# Patient Record
Sex: FEMALE | Race: WHITE | Employment: OTHER | URBAN - METROPOLITAN AREA
[De-identification: names, ages, dates, MRNs, and addresses within clinical notes are randomized per-mention and may not be internally consistent; named-entity substitution may affect disease eponyms.]

---

## 2018-12-31 ENCOUNTER — ANESTHESIA EVENT (OUTPATIENT)
Dept: SURGERY | Age: 68
End: 2018-12-31
Payer: MEDICARE

## 2018-12-31 ENCOUNTER — HOSPITAL ENCOUNTER (OUTPATIENT)
Dept: LAB | Age: 68
Discharge: HOME | End: 2018-12-31
Payer: MEDICARE

## 2018-12-31 DIAGNOSIS — Z01.818 PRE-OP TESTING: ICD-10-CM

## 2018-12-31 LAB
ATRIAL RATE: 68 BPM
CALCULATED P AXIS, ECG09: 43 DEGREES
CALCULATED R AXIS, ECG10: 15 DEGREES
CALCULATED T AXIS, ECG11: 53 DEGREES
DIAGNOSIS, 93000: NORMAL
P-R INTERVAL, ECG05: 154 MS
Q-T INTERVAL, ECG07: 398 MS
QRS DURATION, ECG06: 76 MS
QTC CALCULATION (BEZET), ECG08: 423 MS
VENTRICULAR RATE, ECG03: 68 BPM

## 2018-12-31 PROCEDURE — 93005 ELECTROCARDIOGRAM TRACING: CPT

## 2019-01-01 ENCOUNTER — HOSPITAL ENCOUNTER (OUTPATIENT)
Age: 69
Discharge: HOME | End: 2019-01-01
Attending: ORTHOPAEDIC SURGERY | Admitting: ORTHOPAEDIC SURGERY
Payer: MEDICARE

## 2019-01-01 ENCOUNTER — APPOINTMENT (OUTPATIENT)
Dept: GENERAL RADIOLOGY | Age: 69
End: 2019-01-01
Attending: ORTHOPAEDIC SURGERY
Payer: MEDICARE

## 2019-01-01 ENCOUNTER — ANESTHESIA (OUTPATIENT)
Dept: SURGERY | Age: 69
End: 2019-01-01
Payer: MEDICARE

## 2019-01-01 VITALS
RESPIRATION RATE: 12 BRPM | OXYGEN SATURATION: 94 % | HEIGHT: 67 IN | SYSTOLIC BLOOD PRESSURE: 136 MMHG | HEART RATE: 92 BPM | WEIGHT: 168.5 LBS | TEMPERATURE: 98.7 F | BODY MASS INDEX: 26.45 KG/M2 | DIASTOLIC BLOOD PRESSURE: 61 MMHG

## 2019-01-01 PROBLEM — S52.90XA FRACTURE, RADIUS: Status: ACTIVE | Noted: 2019-01-01

## 2019-01-01 PROCEDURE — 77030016472 HC BIT DRL QC2 SYNT -C: Performed by: ORTHOPAEDIC SURGERY

## 2019-01-01 PROCEDURE — 74011250636 HC RX REV CODE- 250/636: Performed by: ORTHOPAEDIC SURGERY

## 2019-01-01 PROCEDURE — C1713 ANCHOR/SCREW BN/BN,TIS/BN: HCPCS | Performed by: ORTHOPAEDIC SURGERY

## 2019-01-01 PROCEDURE — 64450 NJX AA&/STRD OTHER PN/BRANCH: CPT | Performed by: ANESTHESIOLOGY

## 2019-01-01 PROCEDURE — 74011000250 HC RX REV CODE- 250

## 2019-01-01 PROCEDURE — 76010000153 HC OR TIME 1.5 TO 2 HR: Performed by: ORTHOPAEDIC SURGERY

## 2019-01-01 PROCEDURE — 74011250636 HC RX REV CODE- 250/636

## 2019-01-01 PROCEDURE — 76942 ECHO GUIDE FOR BIOPSY: CPT | Performed by: ANESTHESIOLOGY

## 2019-01-01 PROCEDURE — 74011250636 HC RX REV CODE- 250/636: Performed by: ANESTHESIOLOGY

## 2019-01-01 PROCEDURE — 77030008847 HC WRE K SYNT -A: Performed by: ORTHOPAEDIC SURGERY

## 2019-01-01 PROCEDURE — 77030032490 HC SLV COMPR SCD KNE COVD -B: Performed by: ORTHOPAEDIC SURGERY

## 2019-01-01 PROCEDURE — 74011250636 HC RX REV CODE- 250/636: Performed by: NURSE ANESTHETIST, CERTIFIED REGISTERED

## 2019-01-01 PROCEDURE — 76060000034 HC ANESTHESIA 1.5 TO 2 HR: Performed by: ORTHOPAEDIC SURGERY

## 2019-01-01 PROCEDURE — 77030010509 HC AIRWY LMA MSK TELE -A: Performed by: ANESTHESIOLOGY

## 2019-01-01 PROCEDURE — 77030031139 HC SUT VCRL2 J&J -A: Performed by: ORTHOPAEDIC SURGERY

## 2019-01-01 PROCEDURE — 77030020782 HC GWN BAIR PAWS FLX 3M -B: Performed by: ORTHOPAEDIC SURGERY

## 2019-01-01 PROCEDURE — 77030018836 HC SOL IRR NACL ICUM -A: Performed by: ORTHOPAEDIC SURGERY

## 2019-01-01 PROCEDURE — 76210000026 HC REC RM PH II 1 TO 1.5 HR: Performed by: ORTHOPAEDIC SURGERY

## 2019-01-01 PROCEDURE — 73100 X-RAY EXAM OF WRIST: CPT

## 2019-01-01 PROCEDURE — 76210000063 HC OR PH I REC FIRST 0.5 HR: Performed by: ORTHOPAEDIC SURGERY

## 2019-01-01 PROCEDURE — 77030003601 HC NDL NRV BLK BBMI -A: Performed by: ORTHOPAEDIC SURGERY

## 2019-01-01 PROCEDURE — 77030003862 HC BIT DRL SYNT -B: Performed by: ORTHOPAEDIC SURGERY

## 2019-01-01 PROCEDURE — 77030002916 HC SUT ETHLN J&J -A: Performed by: ORTHOPAEDIC SURGERY

## 2019-01-01 DEVICE — SCREW BNE L22MM DIA2.4MM DST RAD VOLAR S STL ST VAR ANG LOK: Type: IMPLANTABLE DEVICE | Site: WRIST | Status: FUNCTIONAL

## 2019-01-01 DEVICE — 2.4MM VA LOCKING SCREW STARDRIVE 20MM: Type: IMPLANTABLE DEVICE | Site: WRIST | Status: FUNCTIONAL

## 2019-01-01 DEVICE — SPINAL INSTRUMENT RONGEUR (STRAIGHT) 8MM: Type: IMPLANTABLE DEVICE | Site: WRIST | Status: FUNCTIONAL

## 2019-01-01 DEVICE — 2.7MM CORTEX SCREW SLF-TPNG WITH T8 STARDRIVE RECESS 12MM: Type: IMPLANTABLE DEVICE | Site: WRIST | Status: FUNCTIONAL

## 2019-01-01 DEVICE — SCREW BNE L24MM DIA2.4MM DST RAD VOLAR S STL ST VAR ANG LOK: Type: IMPLANTABLE DEVICE | Site: WRIST | Status: FUNCTIONAL

## 2019-01-01 DEVICE — K WIRE FIX L150MM DIA1.25MM S STL TRCR PNT: Type: IMPLANTABLE DEVICE | Status: FUNCTIONAL

## 2019-01-01 DEVICE — SCREW BNE L22MM DIA2.4MM CORT S STL ST T8 STARDRV RECESS: Type: IMPLANTABLE DEVICE | Site: WRIST | Status: FUNCTIONAL

## 2019-01-01 DEVICE — IMPLANTABLE DEVICE: Type: IMPLANTABLE DEVICE | Site: WRIST | Status: FUNCTIONAL

## 2019-01-01 RX ORDER — EPHEDRINE SULFATE/0.9% NACL/PF 25 MG/5 ML
SYRINGE (ML) INTRAVENOUS AS NEEDED
Status: DISCONTINUED | OUTPATIENT
Start: 2019-01-01 | End: 2019-01-01 | Stop reason: HOSPADM

## 2019-01-01 RX ORDER — DEXAMETHASONE SODIUM PHOSPHATE 4 MG/ML
INJECTION, SOLUTION INTRA-ARTICULAR; INTRALESIONAL; INTRAMUSCULAR; INTRAVENOUS; SOFT TISSUE AS NEEDED
Status: DISCONTINUED | OUTPATIENT
Start: 2019-01-01 | End: 2019-01-01 | Stop reason: HOSPADM

## 2019-01-01 RX ORDER — ONDANSETRON 2 MG/ML
INJECTION INTRAMUSCULAR; INTRAVENOUS AS NEEDED
Status: DISCONTINUED | OUTPATIENT
Start: 2019-01-01 | End: 2019-01-01 | Stop reason: HOSPADM

## 2019-01-01 RX ORDER — SODIUM CHLORIDE, SODIUM LACTATE, POTASSIUM CHLORIDE, CALCIUM CHLORIDE 600; 310; 30; 20 MG/100ML; MG/100ML; MG/100ML; MG/100ML
75 INJECTION, SOLUTION INTRAVENOUS CONTINUOUS
Status: DISCONTINUED | OUTPATIENT
Start: 2019-01-01 | End: 2019-01-01 | Stop reason: HOSPADM

## 2019-01-01 RX ORDER — FENTANYL CITRATE 50 UG/ML
50 INJECTION, SOLUTION INTRAMUSCULAR; INTRAVENOUS
Status: DISCONTINUED | OUTPATIENT
Start: 2019-01-01 | End: 2019-01-01 | Stop reason: HOSPADM

## 2019-01-01 RX ORDER — ROPIVACAINE HYDROCHLORIDE 5 MG/ML
INJECTION, SOLUTION EPIDURAL; INFILTRATION; PERINEURAL
Status: COMPLETED | OUTPATIENT
Start: 2019-01-01 | End: 2019-01-01

## 2019-01-01 RX ORDER — SODIUM CHLORIDE 0.9 % (FLUSH) 0.9 %
5-10 SYRINGE (ML) INJECTION EVERY 8 HOURS
Status: DISCONTINUED | OUTPATIENT
Start: 2019-01-01 | End: 2019-01-01 | Stop reason: HOSPADM

## 2019-01-01 RX ORDER — CEFAZOLIN SODIUM 2 G/50ML
2 SOLUTION INTRAVENOUS ONCE
Status: COMPLETED | OUTPATIENT
Start: 2019-01-01 | End: 2019-01-01

## 2019-01-01 RX ORDER — ONDANSETRON 2 MG/ML
4 INJECTION INTRAMUSCULAR; INTRAVENOUS ONCE
Status: COMPLETED | OUTPATIENT
Start: 2019-01-01 | End: 2019-01-01

## 2019-01-01 RX ORDER — LIDOCAINE HYDROCHLORIDE 10 MG/ML
0.1 INJECTION, SOLUTION EPIDURAL; INFILTRATION; INTRACAUDAL; PERINEURAL AS NEEDED
Status: DISCONTINUED | OUTPATIENT
Start: 2019-01-01 | End: 2019-01-01 | Stop reason: HOSPADM

## 2019-01-01 RX ORDER — MIDAZOLAM HYDROCHLORIDE 1 MG/ML
2 INJECTION, SOLUTION INTRAMUSCULAR; INTRAVENOUS ONCE
Status: COMPLETED | OUTPATIENT
Start: 2019-01-01 | End: 2019-01-01

## 2019-01-01 RX ORDER — SODIUM CHLORIDE 0.9 % (FLUSH) 0.9 %
5-10 SYRINGE (ML) INJECTION AS NEEDED
Status: DISCONTINUED | OUTPATIENT
Start: 2019-01-01 | End: 2019-01-01 | Stop reason: HOSPADM

## 2019-01-01 RX ORDER — LIDOCAINE HYDROCHLORIDE 20 MG/ML
INJECTION, SOLUTION EPIDURAL; INFILTRATION; INTRACAUDAL; PERINEURAL AS NEEDED
Status: DISCONTINUED | OUTPATIENT
Start: 2019-01-01 | End: 2019-01-01 | Stop reason: HOSPADM

## 2019-01-01 RX ORDER — ROPIVACAINE HYDROCHLORIDE 5 MG/ML
30 INJECTION, SOLUTION EPIDURAL; INFILTRATION; PERINEURAL
Status: DISCONTINUED | OUTPATIENT
Start: 2019-01-01 | End: 2019-01-01 | Stop reason: HOSPADM

## 2019-01-01 RX ORDER — FENTANYL CITRATE 50 UG/ML
INJECTION, SOLUTION INTRAMUSCULAR; INTRAVENOUS AS NEEDED
Status: DISCONTINUED | OUTPATIENT
Start: 2019-01-01 | End: 2019-01-01 | Stop reason: HOSPADM

## 2019-01-01 RX ORDER — PROPOFOL 10 MG/ML
INJECTION, EMULSION INTRAVENOUS AS NEEDED
Status: DISCONTINUED | OUTPATIENT
Start: 2019-01-01 | End: 2019-01-01 | Stop reason: HOSPADM

## 2019-01-01 RX ADMIN — LIDOCAINE HYDROCHLORIDE 60 MG: 20 INJECTION, SOLUTION EPIDURAL; INFILTRATION; INTRACAUDAL; PERINEURAL at 07:47

## 2019-01-01 RX ADMIN — Medication 10 MG: at 08:19

## 2019-01-01 RX ADMIN — FENTANYL CITRATE 25 MCG: 50 INJECTION, SOLUTION INTRAMUSCULAR; INTRAVENOUS at 08:33

## 2019-01-01 RX ADMIN — FAMOTIDINE 20 MG: 10 INJECTION INTRAVENOUS at 07:15

## 2019-01-01 RX ADMIN — FENTANYL CITRATE 50 MCG: 50 INJECTION INTRAMUSCULAR; INTRAVENOUS at 07:31

## 2019-01-01 RX ADMIN — Medication 10 MG: at 08:01

## 2019-01-01 RX ADMIN — SODIUM CHLORIDE, SODIUM LACTATE, POTASSIUM CHLORIDE, AND CALCIUM CHLORIDE 75 ML/HR: 600; 310; 30; 20 INJECTION, SOLUTION INTRAVENOUS at 07:15

## 2019-01-01 RX ADMIN — FENTANYL CITRATE 25 MCG: 50 INJECTION, SOLUTION INTRAMUSCULAR; INTRAVENOUS at 09:06

## 2019-01-01 RX ADMIN — FENTANYL CITRATE 25 MCG: 50 INJECTION, SOLUTION INTRAMUSCULAR; INTRAVENOUS at 09:22

## 2019-01-01 RX ADMIN — FENTANYL CITRATE 25 MCG: 50 INJECTION, SOLUTION INTRAMUSCULAR; INTRAVENOUS at 08:08

## 2019-01-01 RX ADMIN — SODIUM CHLORIDE, SODIUM LACTATE, POTASSIUM CHLORIDE, AND CALCIUM CHLORIDE: 600; 310; 30; 20 INJECTION, SOLUTION INTRAVENOUS at 07:43

## 2019-01-01 RX ADMIN — PROPOFOL 150 MG: 10 INJECTION, EMULSION INTRAVENOUS at 07:47

## 2019-01-01 RX ADMIN — DEXAMETHASONE SODIUM PHOSPHATE 4 MG: 4 INJECTION, SOLUTION INTRA-ARTICULAR; INTRALESIONAL; INTRAMUSCULAR; INTRAVENOUS; SOFT TISSUE at 08:00

## 2019-01-01 RX ADMIN — ROPIVACAINE HYDROCHLORIDE 30 ML: 5 INJECTION, SOLUTION EPIDURAL; INFILTRATION; PERINEURAL at 07:37

## 2019-01-01 RX ADMIN — CEFAZOLIN SODIUM 2 G: 2 SOLUTION INTRAVENOUS at 07:44

## 2019-01-01 RX ADMIN — MIDAZOLAM HYDROCHLORIDE 2 MG: 2 INJECTION, SOLUTION INTRAMUSCULAR; INTRAVENOUS at 07:31

## 2019-01-01 RX ADMIN — ONDANSETRON 4 MG: 2 INJECTION INTRAMUSCULAR; INTRAVENOUS at 10:15

## 2019-01-01 RX ADMIN — ONDANSETRON 4 MG: 2 INJECTION INTRAMUSCULAR; INTRAVENOUS at 08:58

## 2019-01-01 NOTE — ANESTHESIA POSTPROCEDURE EVALUATION
Procedure(s):  OPEN REDUCTION INTERNAL FIXATION LEFT WRIST.     Anesthesia Post Evaluation      Multimodal analgesia: multimodal analgesia used between 6 hours prior to anesthesia start to PACU discharge  Patient location during evaluation: bedside  Patient participation: complete - patient participated  Level of consciousness: awake  Pain management: adequate  Airway patency: patent  Anesthetic complications: no  Cardiovascular status: stable  Respiratory status: acceptable  Hydration status: acceptable  Post anesthesia nausea and vomiting:  controlled      Visit Vitals  /61   Pulse 92   Temp 37.1 °C (98.7 °F)   Resp 12   Ht 5' 7\" (1.702 m)   Wt 76.4 kg (168 lb 8 oz)   SpO2 94%   BMI 26.39 kg/m²

## 2019-01-01 NOTE — OP NOTES
[facility]  OPERATIVE REPORT    Patty Malone  MR#: [id number]  : 1950  ACCOUNT #: [de-identified]   DATE OF SERVICE: 2019    PREOPERATIVE DIAGNOSIS:  Displaced angulated distal radius and ulnar fracture of the left wrist.    POSTOPERATIVE DIAGNOSIS:  Displaced angulated distal radius and ulnar fracture of the left wrist.    PROCEDURE PERFORMED:  Open reduction internal fixation of distal radius fracture with Synthes volar plate. SURGEON:  Leona Nielsen MD    ASSISTANT:  Mariah Giordano    ANESTHESIA:  General with block. SPECIMENS REMOVED:  None. IMPLANTS:  Volar plate    COMPLICATIONS:  None. ESTIMATED BLOOD LOSS:  20 mL. SUMMARY OF PROCEDURE:  After general anesthesia was induced, the patient's wrist was prepped and draped in the routine sterile fashion. Limb was exsanguinated with an Esmarch bandage and the upper arm tourniquet inflated to 250 mmHg. A volar incision was made extending deeply through to the flexor carpi radialis tendon sheath. This was incised and the flexor carpi radialis was retracted. The underlying pronator quadratus was elevated and the fracture examined. There was some comminution at the distal radius fracture site. Soft tissue was cleared. The fracture was reduced and a Synthes volar plate fixed under C-arm visualization. After this was done, x-ray showed adequate placement of the plate and screws and adequate alignment of the distal radius fracture. The distal ulnar fracture was in good alignment and it was elected not to proceed with an open reduction of the distal ulna. The tourniquet was deflated and hemostasis achieved with electrocautery. The radial pulse was bounding. There was no bleeding from the radial artery. The wound was closed in layers and a volar splint applied.       Rancho RAYGOZA  D: 2019 08:56     T: 2019 09:03  JOB #: [id number]

## 2019-01-01 NOTE — DISCHARGE INSTRUCTIONS
DISCHARGE SUMMARY from Nurse    PATIENT INSTRUCTIONS:    After general anesthesia or intravenous sedation, for 24 hours or while taking prescription Narcotics:  · Limit your activities  · Do not drive and operate hazardous machinery  · Do not make important personal or business decisions  · Do  not drink alcoholic beverages  · If you have not urinated within 8 hours after discharge, please contact your surgeon on call. Report the following to your surgeon:  · Excessive pain, swelling, redness or odor of or around the surgical area  · Temperature over 100.5  · Nausea and vomiting lasting longer than 4 hours or if unable to take medications  · Any signs of decreased circulation or nerve impairment to extremity: change in color, persistent  numbness, tingling, coldness or increase pain  · Any questions    What to do at Home:  Recommended activity: Activity as tolerated and no driving for today and No heavy lifting, pushing, pulling with the left arm until cleared by Dr. Maryse Powers. *  Please give a list of your current medications to your Primary Care Provider. *  Please update this list whenever your medications are discontinued, doses are      changed, or new medications (including over-the-counter products) are added. *  Please carry medication information at all times in case of emergency situations. These are general instructions for a healthy lifestyle:    No smoking/ No tobacco products/ Avoid exposure to second hand smoke  Surgeon General's Warning:  Quitting smoking now greatly reduces serious risk to your health.     Obesity, smoking, and sedentary lifestyle greatly increases your risk for illness    A healthy diet, regular physical exercise & weight monitoring are important for maintaining a healthy lifestyle    You may be retaining fluid if you have a history of heart failure or if you experience any of the following symptoms:  Weight gain of 3 pounds or more overnight or 5 pounds in a week, increased swelling in our hands or feet or shortness of breath while lying flat in bed. Please call your doctor as soon as you notice any of these symptoms; do not wait until your next office visit. Recognize signs and symptoms of STROKE:    F-face looks uneven    A-arms unable to move or move unevenly    S-speech slurred or non-existent    T-time-call 911 as soon as signs and symptoms begin-DO NOT go       Back to bed or wait to see if you get better-TIME IS BRAIN. Warning Signs of HEART ATTACK     Call 911 if you have these symptoms:   Chest discomfort. Most heart attacks involve discomfort in the center of the chest that lasts more than a few minutes, or that goes away and comes back. It can feel like uncomfortable pressure, squeezing, fullness, or pain.  Discomfort in other areas of the upper body. Symptoms can include pain or discomfort in one or both arms, the back, neck, jaw, or stomach.  Shortness of breath with or without chest discomfort.  Other signs may include breaking out in a cold sweat, nausea, or lightheadedness. Don't wait more than five minutes to call 911 - MINUTES MATTER! Fast action can save your life. Calling 911 is almost always the fastest way to get lifesaving treatment. Emergency Medical Services staff can begin treatment when they arrive -- up to an hour sooner than if someone gets to the hospital by car. Open Reduction With Internal Fixation of a Limb: What to Expect at [address] can expect some pain and swelling around the cut (incision) the doctor made. This should get better within a few days after your surgery. But it is normal to have some pain for 2 to 3 weeks after surgery and mild pain for up to 6 weeks after surgery. How soon you can return to work and your normal routine depends on your job and how long it takes the bone to heal. For example, if you have a fractured leg and you sit at work, you may be able to go back in 1 to 2 weeks.  But if your job requires you to walk or stand a lot, you will need to wait until your fracture has healed before you go back to work. This care sheet gives you a general idea about how long it will take for you to recover. But each person recovers at a different pace. Follow the steps below to get better as quickly as possible. How can you care for yourself at home? Activity    · Rest when you feel tired. Getting enough sleep will help you recover.     · Increase your activity as recommended by your doctor. Being active boosts blood flow and helps prevent pneumonia and constipation. It is usually okay to exercise other parts of your body as soon as you feel well enough.     · Avoid putting weight on your repaired bone until your doctor says it is okay.     · You will probably need to take 1 to 2 weeks off from work. It depends on the type of work you do and how you feel.     · Do not shower for 1 or 2 days after surgery. When you shower, keep your dressing and incisions dry. If you have a cast, tape a sheet of plastic to cover it so that it does not get wet. It may help to sit on a shower stool.     · Do not take a bath, swim, use a hot tub, or soak your affected limb until your incision is healed. This usually takes 1 to 2 weeks. Diet    · You can eat your normal diet. If your stomach is upset, try bland, low-fat foods like plain rice, broiled chicken, toast, and yogurt. Medicines    · Your doctor will tell you if and when you can restart your medicines. He or she will also give you instructions about taking any new medicines.     · If you take blood thinners, such as warfarin (Coumadin), clopidogrel (Plavix), or aspirin, be sure to talk to your doctor. He or she will tell you if and when to start taking those medicines again. Make sure that you understand exactly what your doctor wants you to do.     · Take pain medicines exactly as directed.   ? If the doctor gave you a prescription medicine for pain, take it as prescribed. ? If you are not taking a prescription pain medicine, ask your doctor if you can take an over-the-counter medicine.     · If you think your pain medicine is making you sick to your stomach:  ? Take your medicine after meals (unless your doctor has told you not to). ? Ask your doctor for a different pain medicine.     · If your doctor prescribed antibiotics, take them as directed. Do not stop taking them just because you feel better. You need to take the full course of antibiotics. Incision care    · If you have strips of tape on the incision, leave the tape on for a week or until it falls off.     · If you do not have a cast, clean the incision 2 times a day after your doctor allows you to remove the bandage. Use only soap and water to clean the incision unless your doctor gives you different instructions. Don't use hydrogen peroxide or alcohol, which can slow healing. Exercise    · Do exercises as instructed by your doctor or physical therapist. These exercises will help keep your muscles strong and your joints flexible while your bone is healing.     · Wiggle your fingers or toes on the injured arm or leg often. This helps reduce swelling and stiffness. Ice and elevation    · Prop up the injured arm or leg on a pillow when you ice it or anytime you sit or lie down during the first 1 to 2 weeks after your surgery. Try to keep it above the level of your heart. This will help reduce swelling and pain. Other instructions    · If you have a cast or splint:  ? Keep it dry. ? If you have a removable splint, ask your doctor if it is okay to take it off to bathe. Your doctor may want you to keep it on as much as possible. Be careful not to put the splint on too tight. ? Do not stick objects such as pencils or coat hangers in your cast or splint to scratch your skin. ? Do not put powder into your cast or splint to relieve itchy skin. ? Never cut or alter your cast or splint.    Follow-up care is a key part of your treatment and safety. Be sure to make and go to all appointments, and call your doctor if you are having problems. It's also a good idea to know your test results and keep a list of the medicines you take. When should you call for help? Call 911 anytime you think you may need emergency care. For example, call if:    · You passed out (lost consciousness).     · You have severe trouble breathing.     · You have sudden chest pain and shortness of breath, or you cough up blood.    Call your doctor now or seek immediate medical care if:    · You have pain that does not get better after you take pain medicine.     · Your fingers or toes on the injured arm or leg are cool, pale, or change color.     · You have tingling or numbness in your fingers or toes.     · You cannot move your fingers or toes.     · Your cast or splint feels too tight.     · The skin under your cast or splint is burning or stinging.     · You have signs of infection, such as:  ? Increased pain, swelling, warmth, or redness. ? Red streaks leading from the incision. ? Pus draining from the incision. ? A fever.     · You have drainage or a bad smell coming from the cast or splint.     · You have signs of a blood clot, such as:  ? Pain in your calf, back of the knee, thigh, or groin. ? Redness and swelling in your leg or groin.    Watch closely for any changes in your health, and be sure to contact your doctor if:    · You have any problems with your cast or splint. Where can you learn more? Go to http://criss-silvino.info/. Enter O768 in the search box to learn more about \"Open Reduction With Internal Fixation of a Limb: What to Expect at Home. \"  Current as of: November 29, 2017  Content Version: 11.8  © 1013-7718 [employer]. Care instructions adapted under license by [employer] (which disclaims liability or warranty for this information).  If you have questions about a medical condition or this instruction, always ask your healthcare professional. [address] any warranty or liability for your use of this information. Promethazine (By mouth)   Promethazine (proe-METH-a-zeen)  Treats allergies and motion sickness. Also used before and after surgery and other procedures as a sedative and to control pain or nausea and vomiting. This medicine is a phenothiazine. Brand Name(s):   There may be other brand names for this medicine. When This Medicine Should Not Be Used: This medicine is not right for everyone. Do not use it if you had an allergic reaction to promethazine or another phenothiazine medicine, or while you are having asthma symptoms or similar breathing problems. How to Use This Medicine:   Tablet, Liquid  · Your doctor will tell you how much medicine to use. Do not use more than directed. · Measure the oral liquid medicine with a marked measuring spoon, oral syringe, or medicine cup. · Missed dose: Take a dose as soon as you remember. If it is almost time for your next dose, wait until then and take a regular dose. Do not take extra medicine to make up for a missed dose. · Store the medicine in a closed container at room temperature, away from heat, moisture, and direct light. Do not freeze the oral liquid. Drugs and Foods to Avoid:   Ask your doctor or pharmacist before using any other medicine, including over-the-counter medicines, vitamins, and herbal products. · Some medicines can affect how promethazine works. Tell your doctor if you are also using an MAO inhibitor (MAOI). · Tell your doctor if you use anything else that makes you sleepy. Some examples are allergy medicine, narcotic pain medicine, and alcohol.   Warnings While Using This Medicine:   · Tell your doctor if you are pregnant or breastfeeding, or if you have liver disease, heart or blood vessel disease, glaucoma, a stomach ulcer, bowel problems, an enlarged prostate, bone marrow problems, trouble urinating, or seizures. Also tell your doctor if you have breathing problems, such as COPD, asthma, or sleep apnea. · This medicine may cause the following problems:  ¨ Breathing problems, which could be life-threatening  ¨ Neuroleptic malignant syndrome (a nerve disorder that can be life-threatening)  ¨ Liver problems  · Use in children: Give the medicine exactly as directed by the child's doctor. Too much of this medicine can cause death in a young child. Do not give this medicine to a child younger than 3years old, unless your doctor tells you to. · This medicine may make you dizzy or drowsy. Do not drive or do anything that could be dangerous until you know how this medicine affects you. · Tell any doctor or dentist who treats you that you are using this medicine. This medicine may affect certain medical test results. · This medicine may make your skin more sensitive to sunlight. Wear sunscreen. Do not use sunlamps or tanning beds. · Keep all medicine out of the reach of children. Never share your medicine with anyone. Possible Side Effects While Using This Medicine:   Call your doctor right away if you notice any of these side effects:  · Allergic reaction: Itching or hives, swelling in your face or hands, swelling or tingling in your mouth or throat, chest tightness, trouble breathing  · Fever, sweating, confusion, uneven heartbeat, muscle stiffness  · Lightheadedness or fainting  · Seeing or hearing things that are not there (especially in children)  · Seizures  · Trouble breathing, slow breathing  · Twitching or muscle movements you cannot control  · Yellow skin or eyes  If you notice these less serious side effects, talk with your doctor:   · Blurred vision  · Nausea, vomiting, constipation  If you notice other side effects that you think are caused by this medicine, tell your doctor. Call your doctor for medical advice about side effects.  You may report side effects to FDA at 1-800-FDA-1088  © 2017 [facility] Information is for End User's use only and may not be sold, redistributed or otherwise used for commercial purposes. The above information is an  only. It is not intended as medical advice for individual conditions or treatments. Talk to your doctor, nurse or pharmacist before following any medical regimen to see if it is safe and effective for you. The discharge information has been reviewed with the patient and spouse. The patient and spouse verbalized understanding. Discharge medications reviewed with the patient and spouse and appropriate educational materials and side effects teaching were provided.   ___________________________________________________________________________________________________________________________________

## 2019-01-01 NOTE — ANESTHESIA PREPROCEDURE EVALUATION
Anesthetic History   No history of anesthetic complications            Review of Systems / Medical History  Patient summary reviewed and pertinent labs reviewed    Pulmonary  Within defined limits                 Neuro/Psych   Within defined limits           Cardiovascular  Within defined limits                Exercise tolerance: >4 METS     GI/Hepatic/Renal  Within defined limits              Endo/Other      Hypothyroidism       Other Findings   Comments:                  Physical Exam    Airway  Mallampati: II  TM Distance: 4 - 6 cm  Neck ROM: normal range of motion   Mouth opening: Normal     Cardiovascular  Regular rate and rhythm,  S1 and S2 normal,  no murmur, click, rub, or gallop  Rhythm: regular  Rate: normal         Dental  No notable dental hx       Pulmonary  Breath sounds clear to auscultation               Abdominal  GI exam deferred       Other Findings            Anesthetic Plan    ASA: 2  Anesthesia type: general and regional          Induction: Intravenous  Anesthetic plan and risks discussed with: Patient

## 2019-01-01 NOTE — ANESTHESIA PROCEDURE NOTES
Peripheral Block    Start time: 1/1/2019 7:28 AM  End time: 1/1/2019 7:37 AM  Performed by: Cassius Cat MD  Authorized by: Cassius Cat MD       Pre-procedure: Indications: at surgeon's request, post-op pain management and procedure for pain    Preanesthetic Checklist: patient identified, risks and benefits discussed, site marked, timeout performed, anesthesia consent given and patient being monitored      Block Type:   Block Type:  Brachial plexus  Laterality:  Left  Monitoring:  Standard ASA monitoring, continuous pulse ox, frequent vital sign checks, oxygen, responsive to questions and heart rate  Injection Technique:  Single shot  Procedures: ultrasound guided and nerve stimulator    Patient Position: seated  Prep: chlorhexidine    Needle Type:  Stimuplex  Needle Gauge:  22 G  Needle Localization:  Ultrasound guidance    Assessment:  Number of attempts:  1  Injection Assessment:  No intravascular symptoms, negative aspiration for blood, local visualized surrounding nerve on ultrasound, ultrasound image on chart, no paresthesia and incremental injection every 5 mL  Patient tolerance:  Patient tolerated the procedure well with no immediate complications  Location:  [facility]    Patient given 2 mg IV Versed and 50 mcg IV Fentanyl for sedation.     1/1/2019     7:37 AM     Malcolm Cuevas MD

## 2022-03-18 PROBLEM — S52.90XA FRACTURE, RADIUS: Status: ACTIVE | Noted: 2019-01-01

## 2022-08-24 ENCOUNTER — OFFICE VISIT (OUTPATIENT)
Dept: NEUROLOGY | Age: 72
End: 2022-08-24
Payer: MEDICARE

## 2022-08-24 ENCOUNTER — HOSPITAL ENCOUNTER (OUTPATIENT)
Dept: LAB | Age: 72
Discharge: HOME | End: 2022-08-24
Payer: MEDICARE

## 2022-08-24 VITALS
BODY MASS INDEX: 22.88 KG/M2 | HEIGHT: 68 IN | SYSTOLIC BLOOD PRESSURE: 150 MMHG | HEART RATE: 81 BPM | WEIGHT: 151 LBS | OXYGEN SATURATION: 98 % | DIASTOLIC BLOOD PRESSURE: 80 MMHG | RESPIRATION RATE: 18 BRPM

## 2022-08-24 DIAGNOSIS — G62.9 NEUROPATHY: Primary | ICD-10-CM

## 2022-08-24 DIAGNOSIS — G62.9 NEUROPATHY: ICD-10-CM

## 2022-08-24 LAB — VIT B12 SERPL-MCNC: 406 PG/ML (ref 211–911)

## 2022-08-24 PROCEDURE — 82175 ASSAY OF ARSENIC: CPT

## 2022-08-24 PROCEDURE — 3017F COLORECTAL CA SCREEN DOC REV: CPT | Performed by: STUDENT IN AN ORGANIZED HEALTH CARE EDUCATION/TRAINING PROGRAM

## 2022-08-24 PROCEDURE — G8420 CALC BMI NORM PARAMETERS: HCPCS | Performed by: STUDENT IN AN ORGANIZED HEALTH CARE EDUCATION/TRAINING PROGRAM

## 2022-08-24 PROCEDURE — 1123F ACP DISCUSS/DSCN MKR DOCD: CPT | Performed by: STUDENT IN AN ORGANIZED HEALTH CARE EDUCATION/TRAINING PROGRAM

## 2022-08-24 PROCEDURE — 1101F PT FALLS ASSESS-DOCD LE1/YR: CPT | Performed by: STUDENT IN AN ORGANIZED HEALTH CARE EDUCATION/TRAINING PROGRAM

## 2022-08-24 PROCEDURE — G8536 NO DOC ELDER MAL SCRN: HCPCS | Performed by: STUDENT IN AN ORGANIZED HEALTH CARE EDUCATION/TRAINING PROGRAM

## 2022-08-24 PROCEDURE — 83921 ORGANIC ACID SINGLE QUANT: CPT

## 2022-08-24 PROCEDURE — 99204 OFFICE O/P NEW MOD 45 MIN: CPT | Performed by: STUDENT IN AN ORGANIZED HEALTH CARE EDUCATION/TRAINING PROGRAM

## 2022-08-24 PROCEDURE — G8400 PT W/DXA NO RESULTS DOC: HCPCS | Performed by: STUDENT IN AN ORGANIZED HEALTH CARE EDUCATION/TRAINING PROGRAM

## 2022-08-24 PROCEDURE — 36415 COLL VENOUS BLD VENIPUNCTURE: CPT

## 2022-08-24 PROCEDURE — G0463 HOSPITAL OUTPT CLINIC VISIT: HCPCS | Performed by: STUDENT IN AN ORGANIZED HEALTH CARE EDUCATION/TRAINING PROGRAM

## 2022-08-24 PROCEDURE — 84155 ASSAY OF PROTEIN SERUM: CPT

## 2022-08-24 PROCEDURE — G8432 DEP SCR NOT DOC, RNG: HCPCS | Performed by: STUDENT IN AN ORGANIZED HEALTH CARE EDUCATION/TRAINING PROGRAM

## 2022-08-24 PROCEDURE — G8427 DOCREV CUR MEDS BY ELIG CLIN: HCPCS | Performed by: STUDENT IN AN ORGANIZED HEALTH CARE EDUCATION/TRAINING PROGRAM

## 2022-08-24 PROCEDURE — 1090F PRES/ABSN URINE INCON ASSESS: CPT | Performed by: STUDENT IN AN ORGANIZED HEALTH CARE EDUCATION/TRAINING PROGRAM

## 2022-08-24 PROCEDURE — 82607 VITAMIN B-12: CPT

## 2022-08-24 RX ORDER — CHOLECALCIFEROL (VITAMIN D3) 125 MCG
CAPSULE ORAL
COMMUNITY

## 2022-08-24 NOTE — LETTER
8/24/2022    Patient: Francy Ferreira   YOB: 1950   Date of Visit: 8/24/2022     Aleksandar Flores DO  [address]  Via Fax: [phone]    Dear Aleksandar Flores DO,      Thank you for referring Ms. Francy Ferreira to [facility] for evaluation. My notes for this consultation are attached. If you have questions, please do not hesitate to call me. I look forward to following your patient along with you.       Sincerely,    Ayush Israel MD

## 2022-08-24 NOTE — PROGRESS NOTES
Bettina Palafox is a 74 y.o. female . presents for Peripheral Neuropathy    A 74years old female patient with medical history of hypothyroidism and vitamin B12 history recently for evaluation of numbness over her feet for about a couple of weeks duration. Symptoms initially started over the toes and gradually involving to the dorsum distally. Also feels numbness over the sole of her feet. No tingling or burning sensation. No symptoms over the legs. No numbness over her fingers. No weakness of her upper or lower extremities. Numbness is more when resting. Sometimes might feel unsteady when she first stands up and tries to walk. She has to be careful when she walks. She had a fall in 2019. Had wrist fracture on the left side which was treated surgically. When she closes her eyes, might feel unsteady. She has prediabetes and her last hemoglobin A1c was 5.8. Diagnosed with vitamin B12 deficiency about 6 years ago; has been on injections. Currently taking oral supplement. B12 level from August 13 was 428. No previous history of chemotherapy. Denied heavy alcohol use. She has intermittent lower back pain which is nonradiating. She had EMG of the right lower extremity in June 2019 which was suggestive of possible sensory more than motor neuropathy. Review of Systems   Constitutional:  Positive for weight loss (intentional). Negative for chills and fever. HENT:  Positive for hearing loss (mild on the left). Eyes:  Positive for blurred vision (needs reading glasses). Negative for double vision. Respiratory:  Negative for cough and shortness of breath. Cardiovascular:  Negative for chest pain and leg swelling. Gastrointestinal:  Negative for constipation, diarrhea, heartburn, nausea and vomiting. Genitourinary:  Negative for dysuria, frequency and urgency. Musculoskeletal:  Positive for back pain and neck pain. Skin:  Positive for itching and rash (foerarms(elbow and wrist)). Neurological:  Positive for sensory change (feet) and headaches. Negative for dizziness, tingling, tremors, focal weakness and seizures. Endo/Heme/Allergies:  Bruises/bleeds easily. Psychiatric/Behavioral:  Negative for depression. The patient is not nervous/anxious. Past Medical History:   Diagnosis Date    Thyroid disease     hypothyroid       No past surgical history on file. History reviewed. No pertinent family history. Social History     Socioeconomic History    Marital status:      Spouse name: Not on file    Number of children: Not on file    Years of education: Not on file    Highest education level: Not on file   Occupational History    Not on file   Tobacco Use    Smoking status: Never    Smokeless tobacco: Never   Substance and Sexual Activity    Alcohol use: Yes     Comment: once per week    Drug use: No    Sexual activity: Not on file   Other Topics Concern    Not on file   Social History Narrative    Not on file     Social Determinants of Health     Financial Resource Strain: Not on file   Food Insecurity: Not on file   Transportation Needs: Not on file   Physical Activity: Not on file   Stress: Not on file   Social Connections: Not on file   Intimate Partner Violence: Not on file   Housing Stability: Not on file        Allergies   Allergen Reactions    Sulfa (Sulfonamide Antibiotics) Itching     Only after taking for 1 week. Current Outpatient Medications   Medication Sig Dispense Refill    ZINC OXIDE PO Take  by mouth.      cyanocobalamin/cobamamide (B12 SL) by SubLINGual route. cholecalciferol, vitamin D3, 50 mcg (2,000 unit) tab Take  by mouth. COLLAGEN by Does Not Apply route. levothyroxine (SYNTHROID) 75 mcg tablet Take 75 mcg by mouth Daily (before breakfast). Physical Exam  Constitutional:       Appearance: Normal appearance. HENT:      Head: Normocephalic and atraumatic.       Mouth/Throat:      Mouth: Mucous membranes are moist. Pharynx: Oropharynx is clear. No oropharyngeal exudate. Eyes:      Extraocular Movements: Extraocular movements intact. Pupils: Pupils are equal, round, and reactive to light. Pulmonary:      Effort: Pulmonary effort is normal. No respiratory distress. Musculoskeletal:         General: Normal range of motion. Cervical back: Normal range of motion and neck supple. Right lower leg: No edema. Left lower leg: No edema. Neurological:      Mental Status: She is alert. Comments: Mental status: Awake, alert, oriented , follows simple and complex commands, no neglect, no extinction. Speech and languge: fluent, coherent,   and comprehension intact  CN: VFF, EOMI, PERRLA, face sensation intact , no facial asymmetry noted, palate elevation symmetric bilat, SS+SCM 5/5 bilat, tongue midline  Motor: no pronator drift, tone normal throughout, strength 5/5 throughout  Sensory: Decreased light touch and pinprick over the toes and distal foot; decreased vibration at the big toe. Intact motion strength bilaterally. But, Romberg is positive. Coordination: FNF  accurate w/o dysmetria. Unable to do tandem walking. DTR: 2+ throughout  Gait: Normal; impaired tandem. No visits with results within 3 Month(s) from this visit.    Latest known visit with results is:   Hospital Outpatient Visit on 12/31/2018   Component Date Value Ref Range Status    Ventricular Rate 12/31/2018 68  BPM Final    Atrial Rate 12/31/2018 68  BPM Final    P-R Interval 12/31/2018 154  ms Final    QRS Duration 12/31/2018 76  ms Final    Q-T Interval 12/31/2018 398  ms Final    QTC Calculation (Bezet) 12/31/2018 423  ms Final    Calculated P Axis 12/31/2018 43  degrees Final    Calculated R Axis 12/31/2018 15  degrees Final    Calculated T Axis 12/31/2018 53  degrees Final    Diagnosis 12/31/2018    Final                    Value:Normal sinus rhythm  Normal ECG  No previous ECGs available  Confirmed by Mariola Giordano ([phone]) on 12/31/2018 9:25:32 PM               ICD-10-CM ICD-9-CM    1. Neuropathy  G62.9 355.9 VITAMIN B12      METHYLMALONIC ACID      PROTEIN ELECTROPHORESIS      HEAVY METALS PROFILE I, BLOOD        A 74years old female patient with above medical problems including prediabetes and B12 deficiency [currently on B12 sublingual] referred here for evaluation of neuropathy. 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 Previous EMG of the right lower extremity from January 2021 was suggestive of possible mainly sensory neuropathy. No pain at this time. We will get  serum protein electrophoresis and heavy metal screening. Last hemoglobin A1c is 5.8. Discussed the need for blood sugar control as diabetic neuropathy can develop even the prediabetic stage. She will continue with the B12 but depending on the level, can be changed to an IM form. We will see her in 3 months time.

## 2022-08-24 NOTE — PROGRESS NOTES
Bettina Palafox presents today for   Chief Complaint   Patient presents with    Peripheral Neuropathy       Is someone accompanying this pt? no    Is the patient using any DME equipment during OV? no    Depression Screening:  No flowsheet data found. Learning Assessment:  No flowsheet data found. Abuse Screening:  No flowsheet data found. Fall Risk  Fall Risk Assessment, last 12 mths 8/24/2022   Able to walk? Yes   Fall in past 12 months? 0   Do you feel unsteady? 1   Are you worried about falling 0   Is TUG test greater than 12 seconds? 0   Is the gait abnormal? 0         Coordination of Care:  1. Have you been to the ER, urgent care clinic since your last visit? Hospitalized since your last visit? no    2. Have you seen or consulted any other health care providers outside of the [address] [place] [address] since your last visit? Include any pap smears or colon screening.  no

## 2022-08-26 LAB
ALBUMIN SERPL ELPH-MCNC: 3.6 G/DL (ref 2.9–4.4)
ALBUMIN/GLOB SERPL: 1.2 {RATIO} (ref 0.7–1.7)
ALPHA1 GLOB SERPL ELPH-MCNC: 0.2 G/DL (ref 0–0.4)
ALPHA2 GLOB SERPL ELPH-MCNC: 0.7 G/DL (ref 0.4–1)
ARSENIC BLD-MCNC: <1 UG/L (ref 0–9)
B-GLOBULIN SERPL ELPH-MCNC: 1.1 G/DL (ref 0.7–1.3)
GAMMA GLOB SERPL ELPH-MCNC: 1.2 G/DL (ref 0.4–1.8)
GLOBULIN SER CALC-MCNC: 3.1 G/DL (ref 2.2–3.9)
HISPANIC, LDP2T: NORMAL
LEAD BLD-MCNC: <1 UG/DL (ref 0–4)
M PROTEIN SERPL ELPH-MCNC: NORMAL G/DL
MERCURY BLD-MCNC: <1 UG/L (ref 0–14.9)
METHYLMALONATE SERPL-SCNC: 410 NMOL/L (ref 0–378)
PROT SERPL-MCNC: 6.7 G/DL (ref 6–8.5)
RACE, 017371: NORMAL
SPECIMEN SOURCE: NORMAL
TEST PURPOSE, LDP4T: NORMAL

## 2022-08-30 ENCOUNTER — TELEPHONE (OUTPATIENT)
Dept: NEUROLOGY | Age: 72
End: 2022-08-30

## 2022-09-01 ENCOUNTER — TELEPHONE (OUTPATIENT)
Dept: NEUROLOGY | Age: 72
End: 2022-09-01

## 2022-09-01 NOTE — TELEPHONE ENCOUNTER
Pt. Called stating she would like a phone call to go over the results of the test  ordered. Contact: [phone]  Please advise.

## 2022-11-29 ENCOUNTER — OFFICE VISIT (OUTPATIENT)
Dept: NEUROLOGY | Age: 72
End: 2022-11-29
Payer: MEDICARE

## 2022-11-29 VITALS
BODY MASS INDEX: 23.51 KG/M2 | SYSTOLIC BLOOD PRESSURE: 134 MMHG | RESPIRATION RATE: 18 BRPM | HEART RATE: 79 BPM | OXYGEN SATURATION: 99 % | DIASTOLIC BLOOD PRESSURE: 74 MMHG | WEIGHT: 149.8 LBS | HEIGHT: 67 IN

## 2022-11-29 DIAGNOSIS — G62.9 NEUROPATHY: Primary | ICD-10-CM

## 2022-11-29 DIAGNOSIS — E53.8 VITAMIN B12 DEFICIENCY: ICD-10-CM

## 2022-11-29 PROCEDURE — G8536 NO DOC ELDER MAL SCRN: HCPCS | Performed by: STUDENT IN AN ORGANIZED HEALTH CARE EDUCATION/TRAINING PROGRAM

## 2022-11-29 PROCEDURE — 1101F PT FALLS ASSESS-DOCD LE1/YR: CPT | Performed by: STUDENT IN AN ORGANIZED HEALTH CARE EDUCATION/TRAINING PROGRAM

## 2022-11-29 PROCEDURE — 99214 OFFICE O/P EST MOD 30 MIN: CPT | Performed by: STUDENT IN AN ORGANIZED HEALTH CARE EDUCATION/TRAINING PROGRAM

## 2022-11-29 PROCEDURE — 1090F PRES/ABSN URINE INCON ASSESS: CPT | Performed by: STUDENT IN AN ORGANIZED HEALTH CARE EDUCATION/TRAINING PROGRAM

## 2022-11-29 PROCEDURE — G8427 DOCREV CUR MEDS BY ELIG CLIN: HCPCS | Performed by: STUDENT IN AN ORGANIZED HEALTH CARE EDUCATION/TRAINING PROGRAM

## 2022-11-29 PROCEDURE — 1123F ACP DISCUSS/DSCN MKR DOCD: CPT | Performed by: STUDENT IN AN ORGANIZED HEALTH CARE EDUCATION/TRAINING PROGRAM

## 2022-11-29 PROCEDURE — G8420 CALC BMI NORM PARAMETERS: HCPCS | Performed by: STUDENT IN AN ORGANIZED HEALTH CARE EDUCATION/TRAINING PROGRAM

## 2022-11-29 PROCEDURE — 3017F COLORECTAL CA SCREEN DOC REV: CPT | Performed by: STUDENT IN AN ORGANIZED HEALTH CARE EDUCATION/TRAINING PROGRAM

## 2022-11-29 PROCEDURE — G8432 DEP SCR NOT DOC, RNG: HCPCS | Performed by: STUDENT IN AN ORGANIZED HEALTH CARE EDUCATION/TRAINING PROGRAM

## 2022-11-29 PROCEDURE — G0463 HOSPITAL OUTPT CLINIC VISIT: HCPCS | Performed by: STUDENT IN AN ORGANIZED HEALTH CARE EDUCATION/TRAINING PROGRAM

## 2022-11-29 PROCEDURE — G8400 PT W/DXA NO RESULTS DOC: HCPCS | Performed by: STUDENT IN AN ORGANIZED HEALTH CARE EDUCATION/TRAINING PROGRAM

## 2022-11-29 RX ORDER — LANOLIN ALCOHOL/MO/W.PET/CERES
1000 CREAM (GRAM) TOPICAL DAILY
Qty: 90 TABLET | Refills: 2 | Status: SHIPPED | OUTPATIENT
Start: 2022-11-29 | End: 2023-02-27

## 2022-11-29 NOTE — PROGRESS NOTES
Desiree Short is a 74 y.o. female . presents for Follow-up and Tingling    A 74years old female patient here for follow-up of neuropathy. Last seen in the clinic in August 2022. B12 level, renal panel screening, and SPEP were done and were unremarkable. MMA was elevated. She continues to have the numbness over her feet; up to the ankle. Sometimes might involve the distal leg. The cramp is better. Symptoms are more at nighttime. No significant burning sensation or tingling. No extremity weakness. No balance difficulty. She mentioned episodes of intermittent intense itching over her upper extremities bilaterally about once a year. It lasts for about a week and will go away. During the episode, none painful stimuli might feel painful. Do not see any rashes. Previously seen by dermatology and was given crams with no benefit. Currently, do not have any pain or itching. No significant neck pain or pain radiating from the neck to the arm. From initial encounter:  A 74years old female patient with medical history of hypothyroidism and vitamin B12 history recently for evaluation of numbness over her feet for about a couple of weeks duration. Symptoms initially started over the toes and gradually involving to the dorsum distally. Also feels numbness over the sole of her feet. No tingling or burning sensation. No symptoms over the legs. No numbness over her fingers. No weakness of her upper or lower extremities. Numbness is more when resting. Sometimes might feel unsteady when she first stands up and tries to walk. She has to be careful when she walks. She had a fall in 2019. Had wrist fracture on the left side which was treated surgically. When she closes her eyes, might feel unsteady. She has prediabetes and her last hemoglobin A1c was 5.8. Diagnosed with vitamin B12 deficiency about 6 years ago; has been on injections. Currently taking oral supplement. B12 level from August 13 was 428. No previous history of chemotherapy. Denied heavy alcohol use. She has intermittent lower back pain which is nonradiating. She had EMG of the right lower extremity in June 2019 which was suggestive of possible sensory more than motor neuropathy. Review of Systems   Constitutional:  Positive for weight loss (intentional). Negative for chills and fever. HENT:  Positive for hearing loss (mild on the left). Eyes:  Positive for blurred vision (needs reading glasses). Negative for double vision. Respiratory:  Negative for cough and shortness of breath. Cardiovascular:  Negative for chest pain and leg swelling. Gastrointestinal:  Negative for heartburn, nausea and vomiting. Genitourinary:  Negative for dysuria, frequency and urgency. Musculoskeletal:  Positive for back pain. Negative for neck pain. Skin:  Positive for itching (intermittent). Negative for rash. Neurological:  Positive for sensory change (feet) and headaches. Negative for dizziness, tingling, tremors, focal weakness and seizures. Endo/Heme/Allergies:  Bruises/bleeds easily. Past Medical History:   Diagnosis Date    Thyroid disease     hypothyroid       No past surgical history on file. No family history on file.      Social History     Socioeconomic History    Marital status:      Spouse name: Not on file    Number of children: Not on file    Years of education: Not on file    Highest education level: Not on file   Occupational History    Not on file   Tobacco Use    Smoking status: Never    Smokeless tobacco: Never   Substance and Sexual Activity    Alcohol use: Yes     Comment: once per week    Drug use: No    Sexual activity: Not on file   Other Topics Concern    Not on file   Social History Narrative    Not on file     Social Determinants of Health     Financial Resource Strain: Not on file   Food Insecurity: Not on file   Transportation Needs: Not on file   Physical Activity: Not on file   Stress: Not on file Social Connections: Not on file   Intimate Partner Violence: Not on file   Housing Stability: Not on file        Allergies   Allergen Reactions    Sulfa (Sulfonamide Antibiotics) Itching     Only after taking for 1 week. Current Outpatient Medications   Medication Sig Dispense Refill    cyanocobalamin (Vitamin B-12) 1,000 mcg tablet Take 1 Tablet by mouth daily for 90 days. 90 Tablet 2    ZINC OXIDE PO Take  by mouth. cholecalciferol, vitamin D3, 50 mcg (2,000 unit) tab Take  by mouth. COLLAGEN by Does Not Apply route. levothyroxine (SYNTHROID) 75 mcg tablet Take 75 mcg by mouth Daily (before breakfast). Physical Exam  Constitutional:       Appearance: Normal appearance. HENT:      Head: Normocephalic and atraumatic. Mouth/Throat:      Mouth: Mucous membranes are moist.      Pharynx: Oropharynx is clear. No oropharyngeal exudate. Eyes:      Extraocular Movements: Extraocular movements intact. Pupils: Pupils are equal, round, and reactive to light. Pulmonary:      Effort: Pulmonary effort is normal. No respiratory distress. Musculoskeletal:         General: Normal range of motion. Cervical back: Normal range of motion and neck supple. Right lower leg: No edema. Left lower leg: No edema. Neurological:      Mental Status: She is alert. Comments: Mental status: Awake, alert, oriented , follows simple and complex commands, no neglect, no extinction. Speech and languge: fluent, coherent,   and comprehension intact  CN: VFF, EOMI, PERRLA, face sensation intact , no facial asymmetry noted, palate elevation symmetric bilat, SS+SCM 5/5 bilat, tongue midline  Motor: no pronator drift, tone normal throughout, strength 5/5 throughout  Sensory: Decreased light touch and pinprick over the toes and distal foot. Intact position sense bilaterally. Romberg is positive. Coordination: FNF  accurate w/o dysmetria. Unable to do tandem walking.   DTR: 2+ throughout  Gait: Normal.        No visits with results within 3 Month(s) from this visit. Latest known visit with results is:   Hospital Outpatient Visit on 08/24/2022   Component Date Value Ref Range Status    Vitamin B12 08/24/2022 406  211 - 911 pg/mL Final    Methylmalonic acid 08/24/2022 410 (A)  0 - 378 nmol/L Final    Comment: (NOTE)  This test was developed and its performance characteristics  determined by [facility] Mor[facility]. It has not been cleared or approved  by the Food and Drug Administration. Performed At: [facility]  [facility] [address] [id number]  Serena Bernard MD [id number]      Protein, total 08/24/2022 6.7  6.0 - 8.5 g/dL Final    Albumin 08/24/2022 3.6  2.9 - 4.4 g/dL Final    Alpha-1-globulin 08/24/2022 0.2  0.0 - 0.4 g/dL Final    ALPHA-2 GLOBULIN 08/24/2022 0.7  0.4 - 1.0 g/dL Final    Beta globulin 08/24/2022 1.1  0.7 - 1.3 g/dL Final    Gamma globulin 08/24/2022 1.2  0.4 - 1.8 g/dL Final    M-Trey 08/24/2022 Not Observed  Not Observed g/dL Final    Globulin, total 08/24/2022 3.1  2.2 - 3.9 g/dL Final    A/G ratio 08/24/2022 1.2  0.7 - 1.7   Final    Race 08/24/2022 BLOOD    Final     08/24/2022 BLOOD    Final    Sample source 08/24/2022 BLOOD    Final    Test purpose 08/24/2022 BLOOD    Final    Lead, blood 08/24/2022 <1  0 - 4 ug/dL Final    Comment: (NOTE)  Testing performed by Inductively coupled plasma/Mass Spectrometry. Environmental Exposure:                            WHO Recommendation    <20                           Occupational Exposure:                            OSHA Lead Std          40                            MICK                    30                                 Detection Limit =  1  This test was developed and its performance  characteristics determined by [facility]. It has not been  cleared or approved by the Food and Drug Administration.   Performed At: [facility]  [facility] [address] [id number]  Dileep Kenney MD [id number]      Arsenic 2022 <1  0 - 9 ug/L Final    Comment: (NOTE)  This test was developed and its performance characteristics  determined by Kenan Sood. It has not been cleared or approved  by the Food and Drug Administration. Detection Limit = 1      Mercury, blood 2022 <1.0  0.0 - 14.9 ug/L Final    Comment: (NOTE)  This test was developed and its performance characteristics  determined by Kenan Sood. It has not been cleared or approved  by the Food and Drug Administration. Environmental Exposure:  <15.0                         Occupational Exposure:                          MICK - Inorganic Mercury: 15.0                                 Detection Limit =  1.0  Performed At: [address] [id number]  Dileep Kenney MD F               ICD-10-CM ICD-9-CM    1. Neuropathy  G62.9 355.9 cyanocobalamin (Vitamin B-12) 1,000 mcg tablet      2. Vitamin B12 deficiency  E53.8 266.2 cyanocobalamin (Vitamin B-12) 1,000 mcg tablet        A 74years old female patient here for follow-up of neuropathy. Reviewed her labs from the last visit: B12 level is in normal range; heavy metal screening and SPEP were unremarkable. MMA is little high. Patient is on sublingual B12 [500 mcg]. Since the MMA is high, need to increase the dose of B12 to 1000 mcg p.o. per day. I have also advised her to continue close follow-up with her primary care provider follow-up hemoglobin A1c. The intermittent intense itching and pain over the upper extremities is difficult to explain at this time. No symptoms currently. I have told her to call our office if she has another episode. Might need electrophysiological studies if any recurrence. We will see her again in 4 months time.

## 2023-06-20 ENCOUNTER — TELEPHONE (OUTPATIENT)
Age: 73
End: 2023-06-20

## 2023-06-21 DIAGNOSIS — G62.9 POLYNEUROPATHY, UNSPECIFIED: Primary | ICD-10-CM

## 2023-08-02 ENCOUNTER — PROCEDURE VISIT (OUTPATIENT)
Age: 73
End: 2023-08-02

## 2023-08-02 VITALS
BODY MASS INDEX: 23.98 KG/M2 | HEART RATE: 90 BPM | HEIGHT: 67 IN | TEMPERATURE: 97.9 F | DIASTOLIC BLOOD PRESSURE: 74 MMHG | SYSTOLIC BLOOD PRESSURE: 124 MMHG | RESPIRATION RATE: 16 BRPM | WEIGHT: 152.8 LBS

## 2023-08-02 DIAGNOSIS — M54.16 LUMBAR RADICULOPATHY: ICD-10-CM

## 2023-08-02 DIAGNOSIS — G62.9 POLYNEUROPATHY, UNSPECIFIED: ICD-10-CM

## 2023-08-02 DIAGNOSIS — R20.0 NUMBNESS OF BOTH LOWER EXTREMITIES: Primary | ICD-10-CM

## 2023-08-02 PROBLEM — H43.813 VITREOUS DEGENERATION OF BOTH EYES: Status: ACTIVE | Noted: 2022-07-21

## 2023-08-02 PROBLEM — H35.3132 INTERMEDIATE STAGE NONEXUDATIVE AGE-RELATED MACULAR DEGENERATION OF BOTH EYES: Status: ACTIVE | Noted: 2022-07-21

## 2023-08-02 PROBLEM — H25.813 COMBINED FORMS OF AGE-RELATED CATARACT OF BOTH EYES: Status: ACTIVE | Noted: 2022-07-21

## 2023-08-02 PROBLEM — H04.123 DRY EYE SYNDROME OF BOTH EYES: Status: ACTIVE | Noted: 2022-07-21

## 2023-08-02 NOTE — PROGRESS NOTES
[facility]  [address], [address]  [facility], [address]   Phone: [phone]  Fax: [phone]    Soumya Jolly  : 1950  PCP: Traci Woodson,   2023    ELECTROMYOGRAPHY AND NERVE CONDUCTION STUDIES    Soumya Jolly was referred by Dr. Anish Nova for electrodiagnostic evaluation of numbness of BLE. NCV & EMG Findings:  Evaluation of the right sural sensory nerve showed prolonged distal onset latency (3.8 ms). All remaining nerves (as indicated in the following tables) were within normal limits. INTERPRETATION  This is an abnormal electrodiagnostic examination. These findings may be consistent with:  1. Unclear ongoing right L4/L5 radiculopathy - this is based on findings of polyphasic waves and signs of muscle membrane instability in the TA only without any other correlating evidence except for CRD in the right lower lumbar paraspinals        CLINICAL INTERPRETATION  Her electrodiagnostic findings consistent with possible lumbar radiculopathy may be consistent with some of her right leg symptoms. Clinical correlation with lumbar MRI may be appropriate. HISTORY OF PRESENT ILLNESS  Soumya Jolly is a 74 y.o. female. Pt presents today with BLE EMG evaluation for numbness of both feet. She presents with decreased sensation of feet in a stocking distribution, starting at mid-foot. She also notes of lateral calf cramps. PAST MEDICAL HISTORY   Past Medical History:   Diagnosis Date    Thyroid disease     hypothyroid       No past surgical history on file. [facility]       MEDICATIONS    Current Outpatient Medications   Medication Sig Dispense Refill    ZINC OXIDE PO Take by mouth      Cholecalciferol 50 MCG (2000) TABS Take by mouth      cyanocobalamin 1000 MCG tablet Take 1,000 mcg by mouth daily      levothyroxine (SYNTHROID) 75 MCG tablet Take 75 mcg by mouth every morning (before breakfast)       No current facility-administered medications for this

## 2023-08-07 ENCOUNTER — TELEPHONE (OUTPATIENT)
Age: 73
End: 2023-08-07

## 2023-08-07 NOTE — TELEPHONE ENCOUNTER
Patient calling in regards to her getting her EMG done With Dr. Jude Bellamy and she states that he ordered and MRI to get done but needs Dr. Rogers Ball to sign off on the order. Please advise.

## 2023-08-22 DIAGNOSIS — M54.17 L-S RADICULOPATHY: Primary | ICD-10-CM

## 2023-09-06 ENCOUNTER — HOSPITAL ENCOUNTER (OUTPATIENT)
Facility: HOSPITAL | Age: 73
Discharge: HOME | End: 2023-09-09
Attending: STUDENT IN AN ORGANIZED HEALTH CARE EDUCATION/TRAINING PROGRAM
Payer: MEDICARE

## 2023-09-06 DIAGNOSIS — M54.17 L-S RADICULOPATHY: ICD-10-CM

## 2023-09-06 PROCEDURE — 72148 MRI LUMBAR SPINE W/O DYE: CPT

## 2023-09-09 DIAGNOSIS — G89.29 CHRONIC LOW BACK PAIN, UNSPECIFIED BACK PAIN LATERALITY, UNSPECIFIED WHETHER SCIATICA PRESENT: Primary | ICD-10-CM

## 2023-09-09 DIAGNOSIS — M54.50 CHRONIC LOW BACK PAIN, UNSPECIFIED BACK PAIN LATERALITY, UNSPECIFIED WHETHER SCIATICA PRESENT: Primary | ICD-10-CM

## 2023-10-19 ENCOUNTER — OFFICE VISIT (OUTPATIENT)
Age: 73
End: 2023-10-19
Payer: MEDICARE

## 2023-10-19 VITALS
WEIGHT: 150 LBS | BODY MASS INDEX: 23.54 KG/M2 | TEMPERATURE: 96.8 F | HEART RATE: 74 BPM | OXYGEN SATURATION: 100 % | HEIGHT: 67 IN

## 2023-10-19 DIAGNOSIS — M51.37 DDD (DEGENERATIVE DISC DISEASE), LUMBOSACRAL: ICD-10-CM

## 2023-10-19 DIAGNOSIS — M41.9 SCOLIOSIS OF LUMBAR SPINE, UNSPECIFIED SCOLIOSIS TYPE: ICD-10-CM

## 2023-10-19 DIAGNOSIS — M54.16 LUMBAR NEURITIS: ICD-10-CM

## 2023-10-19 DIAGNOSIS — M16.11 OSTEOARTHRITIS OF RIGHT HIP, UNSPECIFIED OSTEOARTHRITIS TYPE: ICD-10-CM

## 2023-10-19 DIAGNOSIS — M47.816 FACET ARTHROPATHY, LUMBAR: ICD-10-CM

## 2023-10-19 DIAGNOSIS — M46.1 DEGENERATIVE JOINT DISEASE OF SACROILIAC JOINT (HCC): ICD-10-CM

## 2023-10-19 DIAGNOSIS — M25.551 RIGHT HIP PAIN: Primary | ICD-10-CM

## 2023-10-19 PROCEDURE — 1123F ACP DISCUSS/DSCN MKR DOCD: CPT | Performed by: PHYSICAL MEDICINE & REHABILITATION

## 2023-10-19 PROCEDURE — 99204 OFFICE O/P NEW MOD 45 MIN: CPT | Performed by: PHYSICAL MEDICINE & REHABILITATION

## 2023-10-19 PROCEDURE — 73502 X-RAY EXAM HIP UNI 2-3 VIEWS: CPT | Performed by: PHYSICAL MEDICINE & REHABILITATION

## 2023-10-19 RX ORDER — COVID-19 ANTIGEN TEST
2 KIT MISCELLANEOUS EVERY 6 HOURS PRN
COMMUNITY

## 2023-10-19 ASSESSMENT — PATIENT HEALTH QUESTIONNAIRE - PHQ9
2. FEELING DOWN, DEPRESSED OR HOPELESS: 0
SUM OF ALL RESPONSES TO PHQ QUESTIONS 1-9: 0
1. LITTLE INTEREST OR PLEASURE IN DOING THINGS: 0
SUM OF ALL RESPONSES TO PHQ QUESTIONS 1-9: 0
SUM OF ALL RESPONSES TO PHQ9 QUESTIONS 1 & 2: 0

## 2023-10-19 NOTE — PROGRESS NOTES
[facility]  [address]  [address]   [place]  Tel: [phone]  Fax: [phone]          INITIAL CONSULTATION      HISTORY OF PRESENT ILLNESS:  Monster Harris is a 74 y.o. female who is referred from Verner Baar, DO secondary to scoliosis arthritis L spine and Sciatica right side. She rates her pain 2-3/10. Patient comes into the office with c/o right groin pain x 1 year, right buttocks pain, Paresthesia in bilateral feet, localized proximal RLE pain (not c/w radiculopathy). Pt denies injury. Pt denies change in bowel or bladder habits. She rates her pain a 3 at night. Pt denies pain during daytime. Patient says her pain has been holding steady since the initial onset. Her pain is exacerbated by lifting heavy things. Patient says her pain is not exacerbated with getting into or out of a car, or putting on shoes and socks. Patient denies recent fevers, weight loss, rashes, or skin sores. No stomach ulcers or bleeding disorders. No history of spinal surgery or injections. Patient denies recent physical therapy. Pt admits to chiropractic care 3 weeks ago, with benefit. Pt denies DM. Patient has taken Aleve, with benefit. PmHx of stage 3 renal disease (GFR of 43.1 on 4/1/2023). A BLE EMG dated 8/2/2023 by Dr. Keaton Batista was suggestive of Unclear ongoing right L4/L5 radiculopathy. Lumbar spine MRI dated 9/6/2023 films independently reviewed by me. Per report, Levoscoliotic curvature of the lumbar spine with multilevel degenerative disc and facet joint disease most advanced at L3-4 and L5-S1. No more than mild spinal canal and foraminal stenosis at any level. Discogenic bone marrow edema signal at L3-4 eccentric to the right, most likely reactive and can be source of pain. Minimal adjacent right paraspinal soft tissue edema, reactive or low-grade inflammation.  Right kidney upper pole subcentimeter lesions, statistically simple cyst and can be evaluated with

## (undated) DEVICE — PACK PROCEDURE SURG MAJ W/ BASIN LF

## (undated) DEVICE — NEEDLE NRV BLK 22GA L2IN 30DEG INSUL BVL EXTN SET STIMUPLEX

## (undated) DEVICE — DRAPE XR C ARM 41X74IN LF --

## (undated) DEVICE — (D)PREP SKN CHLRAPRP APPL 26ML -- CONVERT TO ITEM 371833

## (undated) DEVICE — SUT ETHLN 3-0 18IN PC5 BLK --

## (undated) DEVICE — PADDING CAST SOF-ROL 4INX4YD -- NS

## (undated) DEVICE — BIT DRL L100MM DIA2.4MM QUIK CPL W/O STP REUSE

## (undated) DEVICE — BIT DRL RMFG 2X100MM BIT DISP --

## (undated) DEVICE — DRAPE,HAND,STERILE: Brand: MEDLINE

## (undated) DEVICE — 1.25MM KIRSCHNER WIRE W/TROCAR POINT 150MM

## (undated) DEVICE — PADDING CAST W4INXL4YD ST COT COHESIVE HND TEARABLE SPEC

## (undated) DEVICE — SOLUTION IV 1000ML 0.9% SOD CHL

## (undated) DEVICE — BANDAGE COMPR 9 FTX4 IN SMOOTH COMFORTABLE SYNTH ESMRK LF

## (undated) DEVICE — REM POLYHESIVE ADULT PATIENT RETURN ELECTRODE: Brand: VALLEYLAB

## (undated) DEVICE — KENDALL SCD EXPRESS SLEEVES, KNEE LENGTH, MEDIUM: Brand: KENDALL SCD

## (undated) DEVICE — 3M™ BAIR PAWS FLEX™ WARMING GOWN, STANDARD, 20 PER CASE 81003: Brand: BAIR PAWS™

## (undated) DEVICE — SPLINT CAST PLASTER 4X15FT -- DYNACAST

## (undated) DEVICE — TRNQT RMFG CUFF 2P 18IN W/SLV --

## (undated) DEVICE — INTENDED FOR TISSUE SEPARATION, AND OTHER PROCEDURES THAT REQUIRE A SHARP SURGICAL BLADE TO PUNCTURE OR CUT.: Brand: BARD-PARKER SAFETY BLADES SIZE 10, STERILE

## (undated) DEVICE — ABDOMINAL PAD: Brand: DERMACEA

## (undated) DEVICE — KIT CLN UP BON SECOURS MARYV

## (undated) DEVICE — FLEX ADVANTAGE 3000CC: Brand: FLEX ADVANTAGE

## (undated) DEVICE — PADDING CAST W4INXL4YD NONSTERILE COT COHESIVE HND TEARABLE

## (undated) DEVICE — SHEET,DRAPE,70X100,STERILE: Brand: MEDLINE

## (undated) DEVICE — DRSG GZ OIL EMUL CURAD 3X8 --

## (undated) DEVICE — INTENDED FOR TISSUE SEPARATION, AND OTHER PROCEDURES THAT REQUIRE A SHARP SURGICAL BLADE TO PUNCTURE OR CUT.: Brand: BARD-PARKER SAFETY BLADES SIZE 15, STERILE

## (undated) DEVICE — COVER LT HNDL BLU STRL -- MEDICHOICE

## (undated) DEVICE — THREE-QUARTER SHEET: Brand: CONVERTORS

## (undated) DEVICE — DRAPE,U/SHT,SPLIT,FILM,60X84,STERILE: Brand: MEDLINE

## (undated) DEVICE — GAUZE SPONGES,16 PLY: Brand: CURITY

## (undated) DEVICE — SUTURE VCRL SZ 2-0 L27IN ABSRB VLT L26MM CT-2 1/2 CIR J333H